# Patient Record
Sex: MALE | Race: WHITE | Employment: FULL TIME | URBAN - METROPOLITAN AREA
[De-identification: names, ages, dates, MRNs, and addresses within clinical notes are randomized per-mention and may not be internally consistent; named-entity substitution may affect disease eponyms.]

---

## 2020-06-11 ENCOUNTER — HOSPITAL ENCOUNTER (EMERGENCY)
Age: 25
Discharge: HOME | End: 2020-06-11
Attending: EMERGENCY MEDICINE
Payer: COMMERCIAL

## 2020-06-11 VITALS
BODY MASS INDEX: 32.58 KG/M2 | WEIGHT: 220 LBS | RESPIRATION RATE: 12 BRPM | TEMPERATURE: 98.4 F | HEART RATE: 76 BPM | DIASTOLIC BLOOD PRESSURE: 93 MMHG | SYSTOLIC BLOOD PRESSURE: 147 MMHG | HEIGHT: 69 IN | OXYGEN SATURATION: 97 %

## 2020-06-11 PROCEDURE — 6370000000 HC RX 637 (ALT 250 FOR IP): Performed by: EMERGENCY MEDICINE

## 2020-06-11 PROCEDURE — 99282 EMERGENCY DEPT VISIT SF MDM: CPT

## 2020-06-11 RX ORDER — SULFACETAMIDE SODIUM 100 MG/ML
1 SOLUTION/ DROPS OPHTHALMIC ONCE
Status: COMPLETED | OUTPATIENT
Start: 2020-06-11 | End: 2020-06-11

## 2020-06-11 RX ADMIN — SULFACETAMIDE SODIUM 1 DROP: 100 SOLUTION/ DROPS OPHTHALMIC at 11:00

## 2020-06-11 ASSESSMENT — PAIN DESCRIPTION - ORIENTATION: ORIENTATION: RIGHT

## 2020-06-11 ASSESSMENT — PAIN DESCRIPTION - LOCATION: LOCATION: EYE

## 2020-06-11 ASSESSMENT — PAIN SCALES - GENERAL: PAINLEVEL_OUTOF10: 10

## 2020-06-11 ASSESSMENT — PAIN DESCRIPTION - DESCRIPTORS: DESCRIPTORS: BURNING

## 2020-06-11 ASSESSMENT — PAIN DESCRIPTION - PAIN TYPE: TYPE: ACUTE PAIN

## 2020-06-11 NOTE — ED PROVIDER NOTES
[id number] [facility] ED  [address] [address] [facility] [address]  Phone: [phone]  Fax: Negar Wu 5869      Pt Name: Taco Rico  MRN: [id number]  [place] 1995  Date of evaluation: 6/11/2020    CHIEF COMPLAINT       Chief Complaint   Patient presents with    Foreign Body in Eye     right       [address] Kamala Hurley is a 26 y.o. male who presents to the emergency department complaining of right eye irritation. He was using a saw and thinks he got some sawdust in his eye. Complaining of 10/10 pain denies photophobia. He describes it more as an irritation. He flushed his eye with water and it burns now even more. Denies any other complaints does not wear contacts he was wearing protective eye gear. REVIEW OF SYSTEMS       Constitutional: No fevers or chills   HEENT: No sore throat, rhinorrhea, or earache   Eyes: No blurry vision or double vision no drainage positive right eye redness and irritation  Musculoskeletal: No facial swelling or pain   Skin: No facial rash   Neurological: No headache     PAST MEDICAL HISTORY    has no past medical history on file. SURGICAL HISTORY      has no past surgical history on file. CURRENT MEDICATIONS       Previous Medications    No medications on file       ALLERGIES     has No Known Allergies. FAMILY HISTORY     has no family status information on file. family history is not on file. SOCIAL HISTORY      reports that he has been smoking. He has never used smokeless tobacco. He reports previous alcohol use. He reports that he does not use drugs.     PHYSICAL EXAM       ED Triage Vitals [06/11/20 1011]   BP Temp Temp Source Pulse Resp SpO2 Height Weight   (!) 147/93 98.4 °F (36.9 °C) Oral 76 12 97 % 5' 9\" (1.753 m) 220 lb (99.8 kg)       Constitutional: Alert, oriented x3, nontoxic, answering questions appropriately, acting properly for age, in no acute distress appears